# Patient Record
Sex: MALE | Race: WHITE
[De-identification: names, ages, dates, MRNs, and addresses within clinical notes are randomized per-mention and may not be internally consistent; named-entity substitution may affect disease eponyms.]

---

## 2019-07-29 ENCOUNTER — HOSPITAL ENCOUNTER (EMERGENCY)
Dept: HOSPITAL 62 - ER | Age: 49
Discharge: LEFT BEFORE DISCHARGE | End: 2019-07-29
Payer: SELF-PAY

## 2019-07-29 VITALS — DIASTOLIC BLOOD PRESSURE: 91 MMHG | SYSTOLIC BLOOD PRESSURE: 169 MMHG
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
## 2019-07-29 DIAGNOSIS — Z53.21: Primary | ICD-10-CM

## 2019-07-29 DIAGNOSIS — S61.219A: ICD-10-CM

## 2020-09-08 ENCOUNTER — HOSPITAL ENCOUNTER (EMERGENCY)
Dept: HOSPITAL 62 - ER | Age: 50
Discharge: HOME | End: 2020-09-08
Payer: SELF-PAY

## 2020-09-08 VITALS — DIASTOLIC BLOOD PRESSURE: 89 MMHG | SYSTOLIC BLOOD PRESSURE: 147 MMHG

## 2020-09-08 DIAGNOSIS — L23.7: Primary | ICD-10-CM

## 2020-09-08 DIAGNOSIS — F17.200: ICD-10-CM

## 2020-09-08 PROCEDURE — 99284 EMERGENCY DEPT VISIT MOD MDM: CPT

## 2020-09-08 PROCEDURE — 96372 THER/PROPH/DIAG INJ SC/IM: CPT

## 2020-09-08 NOTE — ER DOCUMENT REPORT
HPI





- HPI


Time Seen by Provider: 09/08/20 14:10


Pain Level: 5


Notes: 





50 yr old male presents today with complaints of poison ivy exposure to his 

right arm and the legs after being exposed to poison oak 4 days ago.  Patient 

states he is tried Benadryl and calamine without relief.  Reports he has some 

itching to his right arm, upper axilla area, right leg.  Has not tried any 

Benadryl today.  Symptoms become progressively worse daily.  Patient reports 

pain from itching is 5 out of 5.  patient has a history of having acute 

outbreaks to poison ivy exposure.  Denies fevers, chills,  chest 

pain,palpitations,  shortness of breath, dyspnea, nausea, vomiting, diarrhea, a

bdominal pain, hematuria,blurred vision, double vision, loss of vision, speech 

changes, LH, dizziness, syncope, headaches, wheezing, ST, URI, neck pain, 

weakness, bowel or bladder dysfunction, saddle anesthesia, numbness or tingling 

in bilateral upper or lower extremities equally, muscle paralysis, weakness in 

bilateral upper or lower extremities equally. 








MEDICATIONS: I agree with the patient medications as charted by the RN.





ALLERGIES: I agree with the allergies as charted by the RN.





PAST MEDICAL HISTORY/PAST SURGICAL HISTORY: Reviewed and agree as charted by RN.





SOCIAL HISTORY: Reviewed and agree as charted by RN.





FAMILY HISTORY: No significant familial comorbid conditions directly related to 

patient complaint





EXAM:


Reviewed vital signs as charted by RN.





REVIEW OF SYSTEMS:reviewed vital signs by RN


CONSTITUTIONAL :  Denies fever,  chills, or sweats.  Denies recent illness.


EENT:   Denies eye, ear, throat, or mouth pain or symptoms.  Denies nasal or 

sinus congestion or discharge.  Denies throat, tongue, or mouth swelling or 

difficulty swallowing.


CARDIOVASCULAR:  Denies chest pain.  Denies palpitations or racing or irregular 

heart beat.  Denies ankle edema.


RESPIRATORY:  Denies cough, cold, or chest congestion.  Denies shortness of 

breath, difficulty breathing, or wheezing.


GASTROINTESTINAL:  Denies abdominal pain or distention.  Denies nausea, 

vomiting, or diarrhea.  Denies blood in vomitus, stools, or per rectum.  Denies 

black, tarry stools.  Denies constipation.  


GENITOURINARY:  Denies difficulty urinating, painful urination, burning, 

frequency, blood in urine, or discharge.


MUSCULOSKELETAL:  Denies back or neck pain or stiffness.  Denies joint pain or 

swelling.


SKIN:  reports rash. denies lesions or sores.


HEMATOLOGIC :   Denies easy bruising or bleeding.


LYMPHATIC:  Denies swollen, enlarged glands.


NEUROLOGICAL:  Denies confusion or altered mental status.  Denies passing out or

loss of consciousness.  Denies dizziness or lightheadedness.  Denies headache.  

Denies weakness or paralysis or loss of use of either side.  Denies problems 

with gait or speech.  Denies sensory loss, numbness, or tingling.  Denies 

seizures.


PSYCHIATRIC:  Denies anxiety or stress.  Denies depression, suicidal ideation, 

or homicidal ideation.





ALL OTHER SYSTEMS REVIEWED AND NEGATIVE.





Dictation was performed using Dragon voice recognition software 





PHYSICAL EXAMINATION:





GENERAL: Well-appearing, well-nourished and in no acute distress.





HEAD: Atraumatic, normocephalic.





EYES: Pupils equal round and reactive to light, extraocular movements intact, 

sclera anicteric, conjunctiva are normal.





ENT: Nares patent, oropharynx clear without exudates.  Moist mucous membranes.





NECK: Normal range of motion, supple without lymphadenopathy





LUNGS: Breath sounds clear to auscultation bilaterally and equal.  No wheezes 

rales or rhonchi.





HEART: Regular rate and rhythm without murmurs





ABDOMEN: Soft, nontender, nondistended abdomen.  No guarding, no rebound.  No 

masses appreciated.





Musculoskeletal: Normal range of motion, no pitting or edema.  No cyanosis.





NEUROLOGICAL: Cranial nerves grossly intact.  Normal speech, normal gait.  

Normal sensory, motor exams 





PSYCH: Normal mood, normal affect.





SKIN: Warm, Dry, normal turgor, no rashes or lesions noted.   Macular papular 

rash in a leaf-like pattern on the right forearm with small blisters. same 

findings to lateral aspect of right knee.   Mild erythema, induration warm to 

touch to right forearm approximately 0.5 cm around blistering rash.  Radial 

pulses +2 bilaterally and equally.    + 2 BUE equally. Negative kanavels 

sign.  No  vascular compromise. Motor and sensory function of ulnar, radial, 

medial nerves intact bilaterally and equally.








- CONSTITUTIONAL


Constitutional: DENIES: Fever, Chills





- REPRODUCTIVE


Reproductive: DENIES: Pregnant:





- MUSCULOSKELETAL


Musculoskeletal: DENIES: Extremity pain





Past Medical History





- General


Information source: Patient





- Social History


Smoking Status: Current Every Day Smoker


Chew tobacco use (# tins/day): No


Frequency of alcohol use: Heavy


Drug Abuse: None


Family History: Reviewed & Not Pertinent


Patient has homicidal ideation: No


Past Surgical History: Reports: Hx Orthopedic Surgery - bilat ankle





- Immunizations


Hx Diphtheria, Pertussis, Tetanus Vaccination: Yes





Vertical Provider Document





- CONSTITUTIONAL


Agree With Documented VS: Yes


Exam Limitations: No Limitations


General Appearance: WD/WN





- INFECTION CONTROL


TRAVEL OUTSIDE OF THE [place] IN LAST 30 DAYS: No





Course





- Re-evaluation


Re-evalutation: 





09/08/20 14:32


Afebrile vital stable no distress.  Nurses notes reviewed.  Patient given 10 mg 

of Decadron IM.  Advised to take prednisone, antibiotics and ointment cream as 

directed.  Advised to not itch rash.  Advised take cold showers.  Follow-up with

primary care provider in the next 24 to 48 hours.  Please avoid scratching.  

After performing a Medical Screening Examination, I estimate there is LOW risk 

for any life threatening rash. At this time the patient looks extremely well and

there are no signs of systemic infection, however this may change at any time 

and the rash may change.  I have reevaluated this patient multiple times and no 

significant life threatening changes are noted. The patient and I have discussed

the diagnosis and risks, and we agree with discharging home with close follow-up

with the understanding that symptoms and presentations can change. We also 

discussed returning to the Emergency Department immediately if new or worsening 

symptoms occur. We have discussed the symptoms which are most concerning (e.g., 

changing or worsening pain, fever, numbness, weakness, cool or painful digits) 

that necessitate immediate return.





- Vital Signs


Vital signs: 


                                        











Temp Pulse Resp BP Pulse Ox


 


 98.5 F   107 H  16   147/89 H  98 


 


 09/08/20 14:07  09/08/20 14:07  09/08/20 14:07  09/08/20 14:07  09/08/20 14:07














Discharge





- Discharge


Clinical Impression: 


 Poison ivy dermatitis





Condition: Stable


Disposition: HOME, SELF-CARE


Instructions:  Corticosteroid Medication ([facility]), Contact Dermatitis ([facility]), 

Topical Steroid Cream or Ointment ([facility]), Clindamycin ([facility])


Additional Instructions: 


Your being seen today for poison ivy.  It appears that some of your rash turned 

in to a little bit of a cellulitis we will treat you with antibiotics along with

steroids.  You were given a shot of Decadron to help with the inflammation.  

Return for any difficulty breathing, vomiting, passing out, or any other 

symptoms that are worrisome to you. You need to take the antibiotics as 

prescribed.  Do not stop even if the rash goes away until you have completed all

the antibiotics.  The area of redness was traced out here in the emergency 

department with a marking pen.  You need to return to emergency department if 

the redness spreads outside of this area by more than 2 cm in any direction.  

You should also return if you develop fevers with temperature greater than 101, 

persistent vomiting, worsening pain, or have any other symptoms that are 

concerning to you.  Take over-the-counter Tylenol and ibuprofen during control 

as needed





Return immediately for any new or worsening symptoms.





Follow up with primary care provider, call tomorrow to make followup 

appointment.


Prescriptions: 


Triamcinolone Acetonide [Aristocort 0.1% Cream] 1 applic TP BID #1 tub


Clindamycin HCl 300 mg PO Q6H #28 capsule


Prednisone [Deltasone 20 mg Tablet] 3 tab PO DAILY 5 Days #15 tablet


Forms:  Return to Work


Referrals: 


HALEY BOB [Primary Care Provider] - Follow up as needed


JOANN HEARN MD [ACTIVE STAFF] - Follow up as needed

## 2020-09-16 ENCOUNTER — HOSPITAL ENCOUNTER (OUTPATIENT)
Dept: HOSPITAL 62 - ER | Age: 50
Setting detail: OBSERVATION
LOS: 2 days | Discharge: HOME | End: 2020-09-18
Attending: NURSE PRACTITIONER | Admitting: FAMILY MEDICINE
Payer: SELF-PAY

## 2020-09-16 DIAGNOSIS — L24.7: Primary | ICD-10-CM

## 2020-09-16 DIAGNOSIS — D72.829: ICD-10-CM

## 2020-09-16 DIAGNOSIS — F17.210: ICD-10-CM

## 2020-09-16 DIAGNOSIS — Z59.0: ICD-10-CM

## 2020-09-16 DIAGNOSIS — E86.0: ICD-10-CM

## 2020-09-16 DIAGNOSIS — L29.9: ICD-10-CM

## 2020-09-16 LAB
ABSOLUTE LYMPHOCYTES# (MANUAL): 0.6 10^3/UL (ref 0.5–4.7)
ABSOLUTE LYMPHOCYTES# (MANUAL): 2.6 10^3/UL (ref 0.5–4.7)
ABSOLUTE MONOCYTES # (MANUAL): 0.6 10^3/UL (ref 0.1–1.4)
ABSOLUTE MONOCYTES # (MANUAL): 1.3 10^3/UL (ref 0.1–1.4)
ADD MANUAL DIFF: YES
ADD MANUAL DIFF: YES
ALBUMIN SERPL-MCNC: 3.8 G/DL (ref 3.5–5)
ALP SERPL-CCNC: 97 U/L (ref 38–126)
ANION GAP SERPL CALC-SCNC: 12 MMOL/L (ref 5–19)
ANISOCYTOSIS BLD QL SMEAR: SLIGHT
AST SERPL-CCNC: 20 U/L (ref 17–59)
BASOPHILS NFR BLD MANUAL: 0 % (ref 0–2)
BASOPHILS NFR BLD MANUAL: 0 % (ref 0–2)
BILIRUB DIRECT SERPL-MCNC: 0.2 MG/DL (ref 0–0.4)
BILIRUB SERPL-MCNC: 1.1 MG/DL (ref 0.2–1.3)
BUN SERPL-MCNC: 18 MG/DL (ref 7–20)
CALCIUM: 9 MG/DL (ref 8.4–10.2)
CHLORIDE SERPL-SCNC: 100 MMOL/L (ref 98–107)
CO2 SERPL-SCNC: 24 MMOL/L (ref 22–30)
DACRYOCYTES BLD QL SMEAR: SLIGHT
DACRYOCYTES BLD QL SMEAR: SLIGHT
EOSINOPHIL NFR BLD MANUAL: 8 % (ref 0–6)
EOSINOPHIL NFR BLD MANUAL: 9 % (ref 0–6)
ERYTHROCYTE [DISTWIDTH] IN BLOOD BY AUTOMATED COUNT: 14 % (ref 11.5–14)
ERYTHROCYTE [DISTWIDTH] IN BLOOD BY AUTOMATED COUNT: 14.3 % (ref 11.5–14)
GLUCOSE SERPL-MCNC: 128 MG/DL (ref 75–110)
HCT VFR BLD CALC: 46.9 % (ref 37.9–51)
HCT VFR BLD CALC: 52.9 % (ref 37.9–51)
HGB BLD-MCNC: 16 G/DL (ref 13.5–17)
HGB BLD-MCNC: 17.8 G/DL (ref 13.5–17)
MCH RBC QN AUTO: 30.3 PG (ref 27–33.4)
MCH RBC QN AUTO: 30.3 PG (ref 27–33.4)
MCHC RBC AUTO-ENTMCNC: 33.7 G/DL (ref 32–36)
MCHC RBC AUTO-ENTMCNC: 34.2 G/DL (ref 32–36)
MCV RBC AUTO: 89 FL (ref 80–97)
MCV RBC AUTO: 90 FL (ref 80–97)
MONOCYTES % (MANUAL): 3 % (ref 3–13)
MONOCYTES % (MANUAL): 5 % (ref 3–13)
NEUTS BAND NFR BLD MANUAL: 1 % (ref 3–5)
PLATELET # BLD: 426 10^3/UL (ref 150–450)
PLATELET # BLD: 479 10^3/UL (ref 150–450)
PLATELET COMMENT: (no result)
PLATELET COMMENT: ADEQUATE
POIKILOCYTOSIS BLD QL SMEAR: SLIGHT
POIKILOCYTOSIS BLD QL SMEAR: SLIGHT
POTASSIUM SERPL-SCNC: 3.9 MMOL/L (ref 3.6–5)
PROT SERPL-MCNC: 6 G/DL (ref 6.3–8.2)
RBC # BLD AUTO: 5.29 10^6/UL (ref 4.35–5.55)
RBC # BLD AUTO: 5.88 10^6/UL (ref 4.35–5.55)
SEGMENTED NEUTROPHILS % (MAN): 75 % (ref 42–78)
SEGMENTED NEUTROPHILS % (MAN): 86 % (ref 42–78)
TOTAL CELLS COUNTED BLD: 100
TOTAL CELLS COUNTED BLD: 100
VARIANT LYMPHS NFR BLD MANUAL: 10 % (ref 13–45)
VARIANT LYMPHS NFR BLD MANUAL: 3 % (ref 13–45)
WBC # BLD AUTO: 20 10^3/UL (ref 4–10.5)
WBC # BLD AUTO: 25.7 10^3/UL (ref 4–10.5)

## 2020-09-16 PROCEDURE — 85027 COMPLETE CBC AUTOMATED: CPT

## 2020-09-16 PROCEDURE — 80053 COMPREHEN METABOLIC PANEL: CPT

## 2020-09-16 PROCEDURE — 80048 BASIC METABOLIC PNL TOTAL CA: CPT

## 2020-09-16 PROCEDURE — 36415 COLL VENOUS BLD VENIPUNCTURE: CPT

## 2020-09-16 PROCEDURE — 96375 TX/PRO/DX INJ NEW DRUG ADDON: CPT

## 2020-09-16 PROCEDURE — G0378 HOSPITAL OBSERVATION PER HR: HCPCS

## 2020-09-16 PROCEDURE — 99285 EMERGENCY DEPT VISIT HI MDM: CPT

## 2020-09-16 PROCEDURE — 83605 ASSAY OF LACTIC ACID: CPT

## 2020-09-16 PROCEDURE — 96374 THER/PROPH/DIAG INJ IV PUSH: CPT

## 2020-09-16 PROCEDURE — 85025 COMPLETE CBC W/AUTO DIFF WBC: CPT

## 2020-09-16 PROCEDURE — 99406 BEHAV CHNG SMOKING 3-10 MIN: CPT

## 2020-09-16 PROCEDURE — 87040 BLOOD CULTURE FOR BACTERIA: CPT

## 2020-09-16 RX ADMIN — SODIUM CHLORIDE PRN MLS/HR: 9 INJECTION, SOLUTION INTRAVENOUS at 22:01

## 2020-09-16 RX ADMIN — CLINDAMYCIN PHOSPHATE SCH MLS/HR: 12 INJECTION, SOLUTION INTRAVENOUS at 22:01

## 2020-09-16 SDOH — ECONOMIC STABILITY - HOUSING INSECURITY: HOMELESSNESS: Z59.0

## 2020-09-16 NOTE — ER DOCUMENT REPORT
ED General





- General


Chief Complaint: Skin Problem


Stated Complaint: SKIN ISSUE


Time Seen by Provider: 09/16/20 17:34


Notes: 





50-year-old male no significant past medical history presents with approximately

1 week of severe worsening erythematous pruritic painful rash over torso, 

bilateral arms, face and right knee.  Patient was working outside in an area 

that had known poison ivy prior to symptom onset.  Patient started on low-dose 

of p.o. steroids which he completed without relief.  No improvement with topical

steroids.  Patient denies any fever, dizziness, vomiting, discharge, change in 

vision, diabetes, HIV, immunocompromise history


TRAVEL OUTSIDE OF THE [place] IN LAST 30 DAYS: No





- Related Data


Allergies/Adverse Reactions: 


                                        





ampicillin [Ampicillin] Allergy (Verified 09/16/20 17:20)


   











Past Medical History





- General


Information source: Patient





- Social History


Smoking Status: Current Every Day Smoker


Chew tobacco use (# tins/day): No


Frequency of alcohol use: None


Drug Abuse: None


Family History: Reviewed & Not Pertinent


Patient has homicidal ideation: No


Past Surgical History: Reports: Hx Orthopedic Surgery - bilat ankle





- Immunizations


Hx Diphtheria, Pertussis, Tetanus Vaccination: Yes





Review of Systems





- Review of Systems


Notes: 





REVIEW OF SYSTEMS:


CONSTITUTIONAL :  Denies fever,  chills, or sweats. 


EENT: Denies recent cold/sinus symptoms, denies throat pain


CARDIOVASCULAR:  Denies chest pain, DICKSON


RESPIRATORY:  Denies cough, denies shortness of breath.


GASTROINTESTINAL:  Denies abdominal pain, nausea/vomiting.


GENITOURINARY:  Denies difficulty urinating, painful urination.


MUSCULOSKELETAL:  Denies neck pain, back pain.


SKIN:   + rash or skin lesions.


HEMATOLOGIC :   Denies easy bruising or bleeding.


LYMPHATIC:  Denies swollen, enlarged glands.


NEUROLOGICAL:  Denies headache, denies change in gait.


PSYCHIATRIC:  Denies anxiety or stress or depression.





Physical Exam





- Vital signs


Vitals: 


                                        











Temp Pulse Resp BP Pulse Ox


 


 97.4 F   93   20   139/82 H  99 


 


 09/16/20 16:38  09/16/20 16:38  09/16/20 16:38  09/16/20 16:38  09/16/20 16:38














- Notes


Notes: 





PHYSICAL EXAMINATION:


 


GENERAL: Well-appearing, well-nourished, talkative pleasant middle-aged man 

appearing stated age appearing uncomfortable from rash pruritus but nontoxic 

appearance and in no acute distress


 


HEAD: Atraumatic, normocephalic.


 


EYES: Pupils equal round and appropriate constriction, sclera anicteric, 

conjunctiva are normal.


 


ENT: nares patent, moist mucous membranes.


 


NECK: Normal range of motion, supple without lymphadenopathy


 


LUNGS: Breath sounds clear to auscultation bilaterally and equal.  No wheezes 

rales or rhonchi.


 


HEART: Regular rate and rhythm without murmurs


 


ABDOMEN: Soft, nontender, no guarding, no masses, no CVAT


 


EXTREMITIES: Normal range of motion, no pitting or edema.  No cyanosis.


 


NEUROLOGICAL: Awake, alert, conversing appropriately, moves all extremities s

pontaneously.


 


PSYCH: Normal mood, normal affect.


 


SKIN: Warm, Dry, normal turgor, confluent erythematous blanching rash over most 

of lower abdomen bilaterally sparing intertriginous area below pannus with 

macular rash with areas of confluence over bilateral arms.  No vesicles, no 

discharge, no purulence.  Small areas of the rash appear to be mildly edematous 

with increased warmth compared to other areas of rash including over left axilla

 and the lateral right lower abdomen.  Rash also mildly involving medial 

proximal thigh bilaterally sparing groin area, this area of rash has excessive 

moisture but no discharge.  Some areas of the mesh have dried and appearing 

mildly scaly and flaky with no impetiginous appearance.  Mild erythema of 

bilateral lower forehead and upper cheeks without discharge, normal conjunctiva,

 no edema in this area.  Flaky erythematous dry blanching rash on right knee.





Course





- Re-evaluation


Re-evalutation: 





09/16/20 21:49


Rash consistent with severe atopic dermatitis from poison ivy/oak/sumac, but 

some areas with excessive moisture.  But they could have fungal superinfection 

and small areas appear equivocally cellulitic.  Patient had labs drawn in triage

 which showed leukocytosis with small bands and neutrophil predominance but CBC 

is hemoconcentrated.  This could represent severe atopic dermatitis response or 

cellulitis, but likely exaggerated by mild dehydration.  Patient's vitals are 

normal, patient is very well-appearing, no signs of disseminated shingles, SJS, 

or TEN.  Given the patient's indolent course and general well appearance without

 systemic symptoms or signs of organ dysfunction less likely eczema herpeticum, 

but will defer this to inpatient team to reevaluate if clinical picture should 

change.  Patient appropriate for inpatient observation given extent of rash and 

mild dehydration.


09/16/20 22:27


Patient is a surgical history of appendectomy approximately 43 years ago and 2-3

 left inguinal hernia repair surgeries greater than 10 years ago, current 1 

pack/day smoker times 20 years, no alcohol or other drug use, mother had htn and

 dm and father had htn. pt accepted to observation by Dr. Weiland.





- Vital Signs


Vital signs: 


                                        











Temp Pulse Resp BP Pulse Ox


 


 98.2 F   63   18   140/71 H  100 


 


 09/16/20 22:00  09/16/20 22:00  09/16/20 22:00  09/16/20 22:00  09/16/20 22:00














- Laboratory


Result Diagrams: 


                                 09/16/20 17:45





                                 09/16/20 19:25


Laboratory results interpreted by me: 


                                        











  09/16/20 09/16/20





  17:45 19:25


 


WBC  25.7 H 


 


RBC  5.88 H 


 


Hgb  17.8 H 


 


Hct  52.9 H 


 


RDW  14.3 H 


 


Plt Count  479 H 


 


Band Neutrophils %  1 L 


 


Lymphocytes % (Manual)  10 L 


 


Eosinophils % (Manual)  9 H 


 


Abs Neuts (Manual)  19.5 H 


 


Absolute Eos (Manual)  2.3 H 


 


Sodium   135.6 L


 


Glucose   128 H


 


Total Protein   6.0 L














Discharge





- Discharge


Clinical Impression: 


 Dermatitis, Dehydration





Cellulitis


Qualifiers:


 Site of cellulitis: unspecified site Qualified Code(s): L03.90 - Cellulitis, 

unspecified





Disposition: ADMITTED AS OBSERVATION


Admitting Provider: Weiland (Hospitalist)


Unit Admitted: Medical Floor


Additional Instructions: 








Poison Ivy





     Poison ivy and poison oak can cause an itchy rash. This is called contact 

dermatitis. It's an allergy to an oil in the plant's leaves. The oil can be 

spread from clothing to skin, from pets to humans, or from one spot on the body 

to another. Washing thoroughly with soap immediately after exposure can prevent 

the rash. (Clothing should be washed as well.)


     If the oil is not removed, an itchy rash develops a few days after the 

exposure. Blisters may develop. Two to three weeks may be required for healing.


     Generally, treatment consists of: 


     (1) an immediate thorough washing with soap to remove the oil, 


     (2) application of a cortisone cream, and 


     (3) antihistamines for itching.


     If the reaction is particularly severe, oral cortisone medicine may be 

required. If there are oozing areas, these can be soaked in epsom salts or 

Burrow's solution.


     Call the doctor if the rash worsens despite treatment, or if signs of 

infection occur such as spreading redness, red streaks, swollen glands, 

swelling, or fever.

## 2020-09-16 NOTE — ER DOCUMENT REPORT
ED Skin Rash/Insect Bite/Abscs





- General


Chief Complaint: Skin Problem


Stated Complaint: SKIN ISSUE


Time Seen by Provider: 09/16/20 17:34


Notes: 





Patient is a 50-year-old male who presents emergency department with a chief 

complaint of rash.  Patient reports he was seen here last week and diagnosed 

with poison ivy.  Patient reports 3 days prior to arriving to the emergency 

department he was exposed to poison ivy while working in the yard.  Patient 

reports that he was placed on steroids, a steroid cream which only lasted 2 days

and clindamycin.  He states since then the rash has continued to spread 

throughout his torso, underneath the arms, and groin area.  Patient reports only

a small spot to his right knee.  Patient denies fever but does report some 

chills.  Patient reports intense itching.


TRAVEL OUTSIDE OF THE [place] IN LAST 30 DAYS: No





- Related Data


Allergies/Adverse Reactions: 


                                        





ampicillin [Ampicillin] Allergy (Verified 09/16/20 17:20)


   











Past Medical History





- General


Information source: Patient





- Social History


Smoking Status: Current Every Day Smoker


Chew tobacco use (# tins/day): No


Frequency of alcohol use: None


Drug Abuse: None


Lives with: Family


Family History: Reviewed & Not Pertinent


Patient has homicidal ideation: No





- Past Medical History


Cardiac Medical History: Reports: None


Pulmonary Medical History: Reports: None


EENT Medical History: Reports: None


Neurological Medical History: Reports: None


Endocrine Medical History: Reports: None


Renal/ Medical History: Reports: None


Malignancy Medical History: Reports None


GI Medical History: Reports: None


Musculoskeletal Medical History: Reports None


Skin Medical History: Reports None


Psychiatric Medical History: Reports: None


Traumatic Medical History: Reports: None


Infectious Medical History: Reports: None


Past Surgical History: Reports: Hx Orthopedic Surgery - bilat ankle





- Immunizations


Hx Diphtheria, Pertussis, Tetanus Vaccination: Yes





Review of Systems





- Review of Systems


Constitutional: No symptoms reported


EENT: No symptoms reported


Cardiovascular: No symptoms reported


Respiratory: No symptoms reported


Gastrointestinal: No symptoms reported


Genitourinary: No symptoms reported


Male Genitourinary: No symptoms reported


Musculoskeletal: No symptoms reported


Skin: See HPI


Hematologic/Lymphatic: No symptoms reported


Neurological/Psychological: No symptoms reported





Physical Exam





- Vital signs


Vitals: 


                                        











Temp Pulse Resp BP Pulse Ox


 


 97.4 F   93   20   139/82 H  99 


 


 09/16/20 16:38  09/16/20 16:38  09/16/20 16:38  09/16/20 16:38  09/16/20 16:38











Interpretation: Normal





- Notes


Notes: 





GENERAL: Well-appearing, well-nourished and in no acute distress.


HEAD: Atraumatic, normocephalic.


EYES: Pupils equal round and reactive to light, extraocular movements intact, 

sclera anicteric, conjunctiva are normal.


ENT: Moist mucous membranes.


NECK: Normal range of motion, supple without lymphadenopathy or JVD.


LUNGS: Breath sounds clear to auscultation bilaterally and equal.  No wheezes 

rales or rhonchi.


HEART: Regular rate and rhythm without murmurs, rubs or gallops.


ABDOMEN: Soft, nontender, normoactive bowel sounds.  No guarding, no rebound.  

No masses appreciated.


BACK: No cervical, thoracic, lumbar midline tenderness.  No saddle anesthesia, 

normal distal neurovascular exam.


GENITOURINARY: Deferred.


EXTREMITIES: Normal range of motion, no pitting or edema.  No clubbing or 

cyanosis.


NEUROLOGICAL: Cranial nerves II through XII grossly intact.  Normal speech, 

normal gait.


PSYCH: Normal mood, normal affect.


SKIN: Patient has scattered vesicles, cracked skin, erythema to the torso.  

Underneath bilateral arms (axilla) patient skin appears raw and irritated, 

appears moist.  No odor.





Course





- Re-evaluation


Re-evalutation: 





09/16/20 17:51 I do suspect that the severity of the dermatitis will need a 

longer course of steroids.  I will initiate an IV, do basic labs, give pain 

medication.  Will reevaluate.





Patient CBC showed an elevated white count of 25.  Lab does have to, and redraw 

the CMP.  I did order blood cultures.  The charge nurse was made aware that the 

patient will be seen by a provider once placed in a room versus staying in 

triage.





- Vital Signs


Vital signs: 


                                        











Temp Pulse Resp BP Pulse Ox


 


 97.4 F   93   20   139/82 H  99 


 


 09/16/20 16:38  09/16/20 16:38  09/16/20 16:38  09/16/20 16:38  09/16/20 16:38














- Laboratory


Result Diagrams: 


                                 09/16/20 17:45





                                 09/16/20 17:45


Laboratory results interpreted by me: 


                                        











  09/16/20





  17:45


 


WBC  25.7 H


 


RBC  5.88 H


 


Hgb  17.8 H


 


Hct  52.9 H


 


RDW  14.3 H


 


Plt Count  479 H


 


Band Neutrophils %  1 L


 


Lymphocytes % (Manual)  10 L


 


Eosinophils % (Manual)  9 H


 


Abs Neuts (Manual)  19.5 H


 


Absolute Eos (Manual)  2.3 H














Discharge





- Discharge


Clinical Impression: 


 Dermatitis





Additional Instructions: 








Poison Ivy





     Poison ivy and poison oak can cause an itchy rash. This is called contact 

dermatitis. It's an allergy to an oil in the plant's leaves. The oil can be 

spread from clothing to skin, from pets to humans, or from one spot on the body 

to another. Washing thoroughly with soap immediately after exposure can prevent 

the rash. (Clothing should be washed as well.)


     If the oil is not removed, an itchy rash develops a few days after the 

exposure. Blisters may develop. Two to three weeks may be required for healing.


     Generally, treatment consists of: 


     (1) an immediate thorough washing with soap to remove the oil, 


     (2) application of a cortisone cream, and 


     (3) antihistamines for itching.


     If the reaction is particularly severe, oral cortisone medicine may be 

required. If there are oozing areas, these can be soaked in epsom salts or 

Burrow's solution.


     Call the doctor if the rash worsens despite treatment, or if signs of 

infection occur such as spreading redness, red streaks, swollen glands, 

swelling, or fever.

## 2020-09-16 NOTE — ER DOCUMENT REPORT
ED Medical Screen (RME)





- General


Chief Complaint: Skin Problem


Stated Complaint: SKIN ISSUE


Time Seen by Provider: 09/16/20 17:34


Notes: 





 





Patient is a 50-year-old male who presents emergency department with a chief c

omplaint of rash.  Patient reports he was seen here last week and diagnosed with

poison ivy.  Patient reports 3 days prior to arriving to the emergency 

department he was exposed to poison ivy while working in the yard.  Patient 

reports that he was placed on steroids, a steroid cream which only lasted 2 days

and clindamycin.  He states since then the rash has continued to spread 

throughout his torso, underneath the arms, and groin area.  Patient reports only

a small spot to his right knee.  Patient denies fever but does report some 

chills.  Patient reports intense itching.


TRAVEL OUTSIDE OF THE [place] IN LAST 30 DAYS: No





- Related Data


Allergies/Adverse Reactions: 


                                        





ampicillin [Ampicillin] Allergy (Verified 09/16/20 17:20)


   











Past Medical History





- Social History


Chew tobacco use (# tins/day): No


Frequency of alcohol use: None


Drug Abuse: None


Past Surgical History: Reports: Hx Orthopedic Surgery - bilat ankle





- Immunizations


Hx Diphtheria, Pertussis, Tetanus Vaccination: Yes





Physical Exam





- Vital signs


Vitals: 





                                        











Temp Pulse Resp BP Pulse Ox


 


 97.4 F   93   20   139/82 H  99 


 


 09/16/20 16:38  09/16/20 16:38  09/16/20 16:38  09/16/20 16:38  09/16/20 16:38














Course





- Re-evaluation


Re-evalutation: 





09/16/20 20:27


Patient has an assortment of scattered dry areas, erythema, dried pustules on 

the torso.  Patient has significant tenderness and erythema underneath bilateral

 axillas.





Due to the extent of the patient's rash I did order basic labs in triage.  It 

was noted that patient had an elevated white count.  He was placed in a private 

room to be seen by a provider.





I have greeted and performed a rapid initial assessment of this patient.  A 

comprehensive ED assessment and evaluation of the patient, analysis of test 

results and completion of the medical decision making process will be conducted 

by additional ED providers.





- Vital Signs


Vital signs: 





                                        











Temp Pulse Resp BP Pulse Ox


 


 97.4 F   93   20   139/82 H  99 


 


 09/16/20 16:38  09/16/20 16:38  09/16/20 16:38  09/16/20 16:38  09/16/20 16:38














- Laboratory


Result Diagrams: 


                                 09/16/20 17:45





                                 09/16/20 19:25


Laboratory results interpreted by me: 





                                        











  09/16/20 09/16/20





  17:45 19:25


 


WBC  25.7 H 


 


RBC  5.88 H 


 


Hgb  17.8 H 


 


Hct  52.9 H 


 


RDW  14.3 H 


 


Plt Count  479 H 


 


Band Neutrophils %  1 L 


 


Lymphocytes % (Manual)  10 L 


 


Eosinophils % (Manual)  9 H 


 


Abs Neuts (Manual)  19.5 H 


 


Absolute Eos (Manual)  2.3 H 


 


Sodium   135.6 L


 


Glucose   128 H


 


Total Protein   6.0 L














Doctor's Discharge





- Discharge


Clinical Impression: 


 Dermatitis





Additional Instructions: 








Poison Ivy





     Poison ivy and poison oak can cause an itchy rash. This is called contact 

dermatitis. It's an allergy to an oil in the plant's leaves. The oil can be 

spread from clothing to skin, from pets to humans, or from one spot on the body 

to another. Washing thoroughly with soap immediately after exposure can prevent 

the rash. (Clothing should be washed as well.)


     If the oil is not removed, an itchy rash develops a few days after the 

exposure. Blisters may develop. Two to three weeks may be required for healing.


     Generally, treatment consists of: 


     (1) an immediate thorough washing with soap to remove the oil, 


     (2) application of a cortisone cream, and 


     (3) antihistamines for itching.


     If the reaction is particularly severe, oral cortisone medicine may be 

required. If there are oozing areas, these can be soaked in epsom salts or 

Burrow's solution.


     Call the doctor if the rash worsens despite treatment, or if signs of 

infection occur such as spreading redness, red streaks, swollen glands, 

swelling, or fever.

## 2020-09-17 LAB
ANION GAP SERPL CALC-SCNC: 6 MMOL/L (ref 5–19)
BUN SERPL-MCNC: 13 MG/DL (ref 7–20)
CALCIUM: 8.4 MG/DL (ref 8.4–10.2)
CHLORIDE SERPL-SCNC: 109 MMOL/L (ref 98–107)
CO2 SERPL-SCNC: 21 MMOL/L (ref 22–30)
ERYTHROCYTE [DISTWIDTH] IN BLOOD BY AUTOMATED COUNT: 14 % (ref 11.5–14)
GLUCOSE SERPL-MCNC: 198 MG/DL (ref 75–110)
HCT VFR BLD CALC: 42.3 % (ref 37.9–51)
HGB BLD-MCNC: 14.6 G/DL (ref 13.5–17)
MCH RBC QN AUTO: 30.5 PG (ref 27–33.4)
MCHC RBC AUTO-ENTMCNC: 34.5 G/DL (ref 32–36)
MCV RBC AUTO: 89 FL (ref 80–97)
PLATELET # BLD: 364 10^3/UL (ref 150–450)
POTASSIUM SERPL-SCNC: 4.9 MMOL/L (ref 3.6–5)
RBC # BLD AUTO: 4.78 10^6/UL (ref 4.35–5.55)
WBC # BLD AUTO: 12.5 10^3/UL (ref 4–10.5)

## 2020-09-17 RX ADMIN — Medication SCH: at 05:20

## 2020-09-17 RX ADMIN — CLINDAMYCIN PHOSPHATE SCH MLS/HR: 12 INJECTION, SOLUTION INTRAVENOUS at 13:07

## 2020-09-17 RX ADMIN — DEXAMETHASONE SCH MG: 4 TABLET ORAL at 13:05

## 2020-09-17 RX ADMIN — DOCUSATE SODIUM SCH: 100 CAPSULE, LIQUID FILLED ORAL at 17:33

## 2020-09-17 RX ADMIN — DOCUSATE SODIUM SCH: 100 CAPSULE, LIQUID FILLED ORAL at 09:56

## 2020-09-17 RX ADMIN — HEPARIN SODIUM SCH UNIT: 5000 INJECTION, SOLUTION INTRAVENOUS; SUBCUTANEOUS at 21:10

## 2020-09-17 RX ADMIN — DOXEPIN HYDROCHLORIDE SCH MG: 25 CAPSULE ORAL at 09:46

## 2020-09-17 RX ADMIN — DEXAMETHASONE SCH MG: 4 TABLET ORAL at 23:20

## 2020-09-17 RX ADMIN — Medication SCH ML: at 21:10

## 2020-09-17 RX ADMIN — DEXTROSE, SODIUM CHLORIDE, SODIUM LACTATE, POTASSIUM CHLORIDE, AND CALCIUM CHLORIDE PRN MLS/HR: 5; .6; .31; .03; .02 INJECTION, SOLUTION INTRAVENOUS at 02:20

## 2020-09-17 RX ADMIN — FAMOTIDINE SCH MG: 20 TABLET, FILM COATED ORAL at 09:46

## 2020-09-17 RX ADMIN — DOCUSATE SODIUM SCH MG: 100 CAPSULE, LIQUID FILLED ORAL at 09:46

## 2020-09-17 RX ADMIN — HEPARIN SODIUM SCH UNIT: 5000 INJECTION, SOLUTION INTRAVENOUS; SUBCUTANEOUS at 05:17

## 2020-09-17 RX ADMIN — DEXTROSE, SODIUM CHLORIDE, SODIUM LACTATE, POTASSIUM CHLORIDE, AND CALCIUM CHLORIDE PRN MLS/HR: 5; .6; .31; .03; .02 INJECTION, SOLUTION INTRAVENOUS at 08:17

## 2020-09-17 RX ADMIN — DEXAMETHASONE SODIUM PHOSPHATE SCH MG: 4 INJECTION, SOLUTION INTRAMUSCULAR; INTRAVENOUS at 09:47

## 2020-09-17 RX ADMIN — FAMOTIDINE SCH MG: 20 TABLET, FILM COATED ORAL at 21:10

## 2020-09-17 RX ADMIN — CEPHALEXIN SCH MG: 500 CAPSULE ORAL at 23:20

## 2020-09-17 RX ADMIN — CLINDAMYCIN PHOSPHATE SCH MLS/HR: 12 INJECTION, SOLUTION INTRAVENOUS at 05:18

## 2020-09-17 RX ADMIN — HEPARIN SODIUM SCH UNIT: 5000 INJECTION, SOLUTION INTRAVENOUS; SUBCUTANEOUS at 13:42

## 2020-09-17 RX ADMIN — DEXAMETHASONE SODIUM PHOSPHATE SCH MG: 4 INJECTION, SOLUTION INTRAMUSCULAR; INTRAVENOUS at 02:18

## 2020-09-17 RX ADMIN — SODIUM CHLORIDE PRN MLS/HR: 9 INJECTION, SOLUTION INTRAVENOUS at 00:20

## 2020-09-17 RX ADMIN — Medication SCH ML: at 13:45

## 2020-09-17 RX ADMIN — DEXAMETHASONE SCH MG: 4 TABLET ORAL at 17:33

## 2020-09-17 RX ADMIN — DOXEPIN HYDROCHLORIDE SCH MG: 25 CAPSULE ORAL at 21:09

## 2020-09-17 NOTE — PDOC PROGRESS REPORT
Subjective


Progress Note for:: 09/17/20


Subjective:: 


Patient is a 50-year-old male with a past medical history significant for 

tobacco dependency with continuous use who was seen in the emergency department 

1 week ago for poison ivy; treated with doxycycline and prednisone at that time.

 He represented with continued generalized contact dermatitis and was admitted 

9/16/2020.





He is found on morning rounds, he is resting in bed, comfortably, on room air.  

He reports that he is feeling somewhat better today and is looking forward to 

discharged home.


He denies fever, chills, chest pain, palpitations, dyspnea, orthopnea, abdominal

pain, nausea vomiting diarrhea.


He has no questions or concerns at this time.


No concerns per nursing.


Reason For Visit: 


PLANT CONTACT DERMATITIS








Physical Exam


Vital Signs: 


                                        











Temp Pulse Resp BP Pulse Ox


 


 97.6 F   80   24 H  147/69 H  100 


 


 09/17/20 12:25  09/17/20 12:25  09/17/20 12:25  09/17/20 12:25  09/17/20 12:25








                                 Intake & Output











 09/16/20 09/17/20 09/18/20





 06:59 06:59 06:59


 


Intake Total  1595 2463


 


Balance  1595 2463


 


Weight  104.2 kg 102.7 kg











General appearance: PRESENT: no acute distress, well-developed, well-nourished


Head exam: PRESENT: atraumatic, normocephalic


Eye exam: PRESENT: conjunctiva pink, EOMI, PERRLA.  ABSENT: scleral icterus


Mouth exam: PRESENT: moist, tongue midline


Respiratory exam: PRESENT: clear to auscultation gilbert, symmetrical, unlabored.  

ABSENT: rales, rhonchi, wheezes


Cardiovascular exam: PRESENT: RRR, +S1, +S2.  ABSENT: diastolic murmur, rubs, 

systolic murmur


Vascular exam: PRESENT: normal capillary refill


Extremities exam: PRESENT: full ROM.  ABSENT: calf tenderness, clubbing, pedal 

edema


Neurological exam: PRESENT: alert, awake, oriented to person, oriented to place,

oriented to time, oriented to situation, CN II-XII grossly intact.  ABSENT: 

motor sensory deficit


Psychiatric exam: PRESENT: appropriate affect, normal mood.  ABSENT: homicidal 

ideation, suicidal ideation


Skin exam: PRESENT: dry, erythema, intact, warm, other - Generalized plant c

ontact dermatitis involving torso, face, neck and all extremities.  ABSENT: 

cyanosis, rash





Results


Laboratory Results: 


                                        





                                 09/17/20 04:08 





                                 09/17/20 04:08 





                                        











  09/16/20 09/16/20 09/16/20





  19:25 20:54 23:03


 


WBC    20.0 H


 


RBC    5.29


 


Hgb    16.0


 


Hct    46.9


 


MCV    89


 


MCH    30.3


 


MCHC    34.2


 


RDW    14.0


 


Plt Count    426


 


Seg Neutrophils %    Not Reportable


 


Sodium  135.6 L  


 


Potassium  3.9  


 


Chloride  100  


 


Carbon Dioxide  24  


 


Anion Gap  12  


 


BUN  18  


 


Creatinine  1.23  


 


Est GFR ( Amer)  > 60  


 


Glucose  128 H  


 


Lactic Acid   2.0 


 


Calcium  9.0  


 


Total Bilirubin  1.1  


 


AST  20  


 


Alkaline Phosphatase  97  


 


Total Protein  6.0 L  


 


Albumin  3.8  














  09/17/20 09/17/20





  04:08 04:08


 


WBC  12.5 H 


 


RBC  4.78 


 


Hgb  14.6 


 


Hct  42.3 


 


MCV  89 


 


MCH  30.5 


 


MCHC  34.5 


 


RDW  14.0 


 


Plt Count  364 


 


Seg Neutrophils %  


 


Sodium   135.8 L


 


Potassium   4.9  D


 


Chloride   109 H


 


Carbon Dioxide   21 L


 


Anion Gap   6


 


BUN   13


 


Creatinine   0.80


 


Est GFR (African Amer)   > 60


 


Glucose   198 H


 


Lactic Acid  


 


Calcium   8.4


 


Total Bilirubin  


 


AST  


 


Alkaline Phosphatase  


 


Total Protein  


 


Albumin  














Assessment and Plan





- Diagnosis


(1) Plant irritant contact dermatitis


Is this a current diagnosis for this admission?: Yes   


Plan: 


Patient is admitted to the medical floor.


Keflex 500 mg every 6 hours for some erythema to right anterior thigh concerning

for early cellulitis.


Initially placed on Decadron 4 mg q6 hrs; will transition to p.o. prednisone (as

patient is self pay and prednisone therapy is more affordable) with slight dose 

reduction today.


Start prednisone 40 mg q12


Triamcinolone cream 3 times daily as needed.  Not to face.


Eucerin cream daily to dry/chapped areas.


Benadryl as needed.  


Pepcid twice daily.








(2) Leukocytosis, unspecified


Qualifiers: 


   Leukocytosis type: unspecified   Qualified Code(s): D72.829 - Elevated white 

blood cell count, unspecified   


Is this a current diagnosis for this admission?: Yes   


Plan: 


Improved; WBC is 25.7-> 20-> 12.5.


Blood cultures are pending.


Potentially related to steroid use for treatment of contact dermatitis.  

However, the patient does have patchy erythema to his right anterior thigh 

concerning for early cellulitis.  


P.o. Keflex.


Remaining management of the above.  


Follow-up CBC.








(3) Pruritus


Is this a current diagnosis for this admission?: Yes   


Plan: 


Benadryl as needed.  


Ice/cool compresses; no hot showers.


Remaining management as above.








(4) Tobacco use disorder, continuous


Is this a current diagnosis for this admission?: Yes   


Plan: 


Smoking cessation encouraged.


Nicotine replacement therapies provided.








- Time


Time Spent with patient: 25-34 minutes


Medications reviewed and adjusted accordingly: Yes


Anticipated Discharge Disposition: Home, Self Care


Anticipated Discharge Timeframe: within 24 hours

## 2020-09-17 NOTE — PDOC H&P
History of Present Illness


Admission Date/PCP: 


09/16/20    22:35


No local PCP


Patient complains of: Rash


History of Present Illness: 


CAT SANTOS is a 50 year old male who presented emergency room with a one-

week history of a poison ivy rash.  He admits working in an area with a lot of 

poison ivy 1 week ago with subsequent development of a rash over his bilateral 

arms, neck, face, chest, back and bilateral lower extremities.  The rash is 

extremely pruritic and is not improving despite treatment with oral and topical 

steroid preparations which he is received on previous visits.  He denies other 

associated or accompanying signs and symptoms.  He denies prior similar 

episodes.  He has not identified any additional aggravating or ameliorating 

factors for his rash.  In the emergency room he was noted to have an extensive 

rash of atopic dermatitis with areas of weeping and crusting.  He was further 

noted to have a leukocytosis of 25,000 with 1% bands that was so concerning to 

the emergency room physician that she demanded that he be admitted for 

observation.  Patient was subsequently admitted to observation status for 

further evaluation and treatment.





Past Medical History


Cardiac Medical History: 


   Denies: Atrial Fibrillation, Coronary Artery Disease, DVT, Myocardial 

Infarction, Hyperlipidema, Hypertension, Pulmonary Embolism


Pulmonary Medical History: 


   Denies: Asthma, Chronic Obstructive Pulmonary Disease (COPD)


EENT Medical History: 


   Denies: Cataracts, Ears - Hearing aids


Neurological Medical History: 


   Denies: Hemorrhagic CVA, Ischemic CVA, Seizures


Endocrine Medical History: 


   Denies: Diabetes Mellitus Type 1, Diabetes Mellitus Type 2, Hyperthyroidism, 

Hypothyroidism, Obesity


Renal/ Medical History: 


   Denies: Chronic Kidney Disease, Nephrolithiasis


Malignancy Medical History: Reports: None


GI Medical History: 


   Denies: Cirrhosis, Crohn's Disease, Gastroesophageal Reflux Disease, 

Hepatitis, Peptic Ulcer Disease, Ulcerative Colitis


Musculoskeltal Medical History: 


   Denies: Arthritis, Fibromyalgia, Gout


Skin Medical History: 


   Denies: Eczema, Psoriasis


Psychiatric Medical History: Reports: Tobacco Dependency


   Denies: Alcohol Dependency, Substance Abuse


Traumatic Medical History: Reports: None


Hematology: 


   Denies: Anemia, Bleeding Tendencies


Infectious Medical History: Reports: None





Past Surgical History


Past Surgical History: Reports: Orthopedic Surgery - bilat ankle





Social History


Information Source: Patient


Lives with: Homeless


Smoking Status: Current Every Day Smoker


Electronic Cigarette use?: No


Frequency of Alcohol Use: None


Hx Recreational Drug Use: No


Drugs: None


Hx Prescription Drug Abuse: No





- Advance Directive


Resuscitation Status: Full Code


Surrogate healthcare decision maker:: 


Lucas Balbuena





Family History


Family History: DM, Hypertension.  denies: CAD, Malignancy


Parental Family History Reviewed: Yes


Children Family History Reviewed: No


Sibling(s) Family History Reviewed.: Yes





Medication/Allergy


Home Medications: 








Clindamycin HCl 300 mg PO Q6H #28 capsule 09/08/20 


Prednisone [Deltasone 20 mg Tablet] 3 tab PO DAILY 5 Days #15 tablet 09/08/20 


Triamcinolone Acetonide [Aristocort 0.1% Cream] 1 applic TP BID #1 tub 09/08/20 








Allergies/Adverse Reactions: 


                                        





ampicillin [Ampicillin] Allergy (Verified 09/16/20 17:20)


   











Review of Systems


Constitutional: ABSENT: chills, fever(s)


Eyes: ABSENT: visual disturbances, other - Eye pain


Ears: ABSENT: hearing changes, other - UTI


Nose, Mouth, and Throat: ABSENT: headache(s), sore throat


Cardiovascular: ABSENT: chest pain, palpitations


Respiratory: ABSENT: cough, dyspnea


Gastrointestinal: ABSENT: abdominal pain, constipation, diarrhea, nausea, 

vomiting


Genitourinary: ABSENT: dysuria, hematuria


Musculoskeletal: ABSENT: back pain, joint swelling


Integumentary: ABSENT: pruritus, rash


Neurological: ABSENT: confusion, convulsions, focal weakness, memory loss, 

syncope


Psychiatric: ABSENT: anxiety, depression


Endocrine: ABSENT: cold intolerance, heat intolerance


Hematologic/Lymphatic: ABSENT: easy bleeding, easy bruising


Allergic/Immunologic: ABSENT: seasonal rhinorrhea





Physical Exam


Vital Signs: 


                                        











Temp Pulse Resp BP Pulse Ox


 


 98.2 F   63   18   140/71 H  100 


 


 09/16/20 22:00  09/16/20 22:00  09/16/20 22:00  09/16/20 22:00  09/16/20 22:00








                                 Intake & Output











 09/14/20 09/15/20 09/16/20





 23:59 23:59 23:59


 


Weight   104.2 kg











General appearance: PRESENT: cooperative, disheveled, other - Moderate distress 

and due to pruritus


Head exam: PRESENT: atraumatic, normocephalic


Eye exam: PRESENT: conjunctiva pink.  ABSENT: conjunctival injection, scleral 

icterus


Ear exam: PRESENT: normal external ear exam.  ABSENT: bleeding, drainage


Mouth exam: PRESENT: dry mucosa, neck supple


Neck exam: ABSENT: thyromegaly, tracheal deviation


Respiratory exam: PRESENT: clear to auscultation gilbert, symmetrical, unlabored


Cardiovascular exam: PRESENT: RRR.  ABSENT: clicks, gallop, rubs


Pulses: PRESENT: normal radial pulses, normal dorsalis pedis pul


Vascular exam: PRESENT: normal capillary refill.  ABSENT: pallor


GI/Abdominal exam: PRESENT: normal bowel sounds, soft.  ABSENT: tenderness


Rectal exam: PRESENT: deferred


Extremities exam: ABSENT: joint swelling, pedal edema


Musculoskeletal exam: ABSENT: deformity, dislocation


Neurological exam: PRESENT: alert, oriented to person, oriented to place, 

oriented to time, oriented to situation, CN II-XII grossly intact.  ABSENT: 

motor sensory deficit


Psychiatric exam: PRESENT: appropriate affect, normal mood


Skin exam: PRESENT: dry, intact, rash - Generalized plant contact dermatitis 

involving torso, face, neck and all extremities., warm.  ABSENT: jaundice, 

urticaria





Results


Laboratory Results: 


                                        





                                 09/16/20 17:45 





                                 09/16/20 19:25 





                                        











  09/16/20 09/16/20 09/16/20





  17:45 17:45 19:25


 


WBC  25.7 H  


 


RBC  5.88 H  


 


Hgb  17.8 H  


 


Hct  52.9 H  


 


MCV  90  


 


MCH  30.3  


 


MCHC  33.7  


 


RDW  14.3 H  


 


Plt Count  479 H  


 


Seg Neutrophils %  Not Reportable  


 


Sodium   Cancelled  135.6 L


 


Potassium   Cancelled  3.9


 


Chloride   Cancelled  100


 


Carbon Dioxide   Cancelled  24


 


Anion Gap   Cancelled  12


 


BUN   Cancelled  18


 


Creatinine   Cancelled  1.23


 


Est GFR ( Amer)   Cancelled  > 60


 


Est GFR (Non-Af Amer)   Cancelled 


 


Glucose   Cancelled  128 H


 


Lactic Acid   


 


Calcium   Cancelled  9.0


 


Total Bilirubin   Cancelled  1.1


 


AST   Cancelled  20


 


Alkaline Phosphatase   Cancelled  97


 


Total Protein   Cancelled  6.0 L


 


Albumin   Cancelled  3.8














  09/16/20





  20:54


 


WBC 


 


RBC 


 


Hgb 


 


Hct 


 


MCV 


 


MCH 


 


MCHC 


 


RDW 


 


Plt Count 


 


Seg Neutrophils % 


 


Sodium 


 


Potassium 


 


Chloride 


 


Carbon Dioxide 


 


Anion Gap 


 


BUN 


 


Creatinine 


 


Est GFR ( Amer) 


 


Est GFR (Non-Af Amer) 


 


Glucose 


 


Lactic Acid  2.0


 


Calcium 


 


Total Bilirubin 


 


AST 


 


Alkaline Phosphatase 


 


Total Protein 


 


Albumin 














Assessment and Plan





- Diagnosis


(1) Plant irritant contact dermatitis


Is this a current diagnosis for this admission?: Yes   





(2) Pruritus


Is this a current diagnosis for this admission?: Yes   





(3) Leukocytosis, unspecified


Qualifiers: 


   Leukocytosis type: unspecified   Qualified Code(s): D72.829 - Elevated white 

blood cell count, unspecified   


Is this a current diagnosis for this admission?: Yes   





(4) Tobacco use disorder, continuous


Is this a current diagnosis for this admission?: Yes   





- Plan Summary


Summary: 


Patient was admitted observation status on the medical floor where he will 

receive routine supportive and symptomatic cares.  He will be treated with IV 

fluids utilizing D5LR at 167 mL/h.  He will receive dexamethasone intravenously 

with an initial burst dose protocol and conversion to oral therapy.  He will 

receive doxepin 50 mg p.o. every 12 hours for pruritus.  He will receive mo

rphine sulfate 2 to 4 mg IV every 2 hours as needed for pain.  He will receive 

Ativan 1 mg IV every 4 hours as needed for anxiety or restlessness.  Smoking 

cessation is advised and counseled briefly at the bedside.  A nicotine 

replacement patch is available for the patient's use if desired.  CBCs, m

etabolic profiles and additional laboratory and/or radiographic evaluations will

 be obtained as needed.





- Time


Time Spent with patient: 15-24 minutes


Smoking Cessation Education: 3 to 10 minutes


Medications reviewed and adjusted accordingly: Yes


Anticipated Discharge Disposition: Home, Self Care


Anticipated Discharge Timeframe: within 24 hours





- Inpatient Certification


Based on my medical assessment, after consideration of the patient's 

comorbidities, presenting symptoms, or acuity I expect that the services needed 

warrant INPATIENT care.: No


I certify that my determination is in accordance with my understanding of 

Medicare's requirements for reasonable and necessary INPATIENT services [42 CFR 

412.3e].: No

## 2020-09-18 VITALS — SYSTOLIC BLOOD PRESSURE: 134 MMHG | DIASTOLIC BLOOD PRESSURE: 80 MMHG

## 2020-09-18 LAB
ERYTHROCYTE [DISTWIDTH] IN BLOOD BY AUTOMATED COUNT: 14.4 % (ref 11.5–14)
HCT VFR BLD CALC: 39 % (ref 37.9–51)
HGB BLD-MCNC: 13.3 G/DL (ref 13.5–17)
MCH RBC QN AUTO: 30.2 PG (ref 27–33.4)
MCHC RBC AUTO-ENTMCNC: 34.1 G/DL (ref 32–36)
MCV RBC AUTO: 89 FL (ref 80–97)
PLATELET # BLD: 356 10^3/UL (ref 150–450)
RBC # BLD AUTO: 4.39 10^6/UL (ref 4.35–5.55)
WBC # BLD AUTO: 20.2 10^3/UL (ref 4–10.5)

## 2020-09-18 RX ADMIN — DOXEPIN HYDROCHLORIDE SCH MG: 25 CAPSULE ORAL at 10:03

## 2020-09-18 RX ADMIN — FAMOTIDINE SCH MG: 20 TABLET, FILM COATED ORAL at 10:03

## 2020-09-18 RX ADMIN — DOCUSATE SODIUM SCH MG: 100 CAPSULE, LIQUID FILLED ORAL at 10:03

## 2020-09-18 RX ADMIN — DEXAMETHASONE SCH MG: 4 TABLET ORAL at 05:46

## 2020-09-18 RX ADMIN — CEPHALEXIN SCH MG: 500 CAPSULE ORAL at 05:46

## 2020-09-18 RX ADMIN — Medication SCH: at 09:47

## 2020-09-18 RX ADMIN — HEPARIN SODIUM SCH UNIT: 5000 INJECTION, SOLUTION INTRAVENOUS; SUBCUTANEOUS at 05:45

## 2020-09-18 NOTE — PDOC DISCHARGE SUMMARY
Impression





- Admit/DC Date/PCP


Admission Date/Primary Care Provider: 


  09/16/20 22:35





  





Discharge Date: 09/18/20





- Discharge Diagnosis


(1) Plant irritant contact dermatitis


Is this a current diagnosis for this admission?: Yes   





(2) Leukocytosis, unspecified


Is this a current diagnosis for this admission?: Yes   





(3) Pruritus


Is this a current diagnosis for this admission?: Yes   





(4) Tobacco use disorder, continuous


Is this a current diagnosis for this admission?: Yes   





- Additional Information


Resuscitation Status: Full Code


Discharge Diet: Regular


Discharge Activity: Activity As Tolerated, Balance Activity w/Rest


Prescriptions: 


Triamcinolone Acetonide [Aristocort 0.1% Cream] 1 applic TOP TIDP PRN #1 tube


 PRN Reason: 


Prednisone [Deltasone 20 mg Tablet] 20 mg PO ASDIR PRN #20 tablet


 PRN Reason: 


Prednisone [Deltasone 20 mg Tablet] 20 mg PO ASDIR #20 tablet


Cephalexin Monohydrate [Keflex 500 mg Capsule] 500 mg PO Q6 #20 capsule


Nicotine [Nicoderm 21 mg/24 Hr Transderm Patch] 1 each TD DAILYP PRN #30 

[email or web address]


 PRN Reason: 


Home Medications: 








Acetaminophen [Tylenol 325 mg Tablet] 650 mg PO Q4HP PRN  tablet 09/18/20 


Cephalexin Monohydrate [Keflex 500 mg Capsule] 500 mg PO Q6 #20 capsule 09/18/20




Diphenhydramine HCl [Benadryl 25 mg Capsule] 25 mg PO Q6HP PRN  capsule 09/18/20




Mineral Oil/Petrolatum,White [Eucerin Cream 114 gm] 1 applic TP DAILY  jar 

09/18/20 


Nicotine [Nicoderm 21 mg/24 Hr Transderm Patch] 1 each TD DAILYP PRN #30 

[email or web address] 09/18/20 


Prednisone [Deltasone 20 mg Tablet] 20 mg PO ASDIR #20 tablet 09/18/20 


Prednisone [Deltasone 20 mg Tablet] 20 mg PO ASDIR PRN #20 tablet 09/18/20 


Triamcinolone Acetonide [Aristocort 0.1% Cream] 1 applic TOP TIDP PRN #1 tube 

09/18/20 











History of Present Illiness


History of Present Illness: 


Per H&P by Dr. Weiland: CAT SANTOS is a 50 year old male who presented 

emergency room with a one-week history of a poison ivy rash.  He admits working 

in an area with a lot of poison ivy 1 week ago with subsequent development of a 

rash over his bilateral arms, neck, face, chest, back and bilateral lower 

extremities.  The rash is extremely pruritic and is not improving despite 

treatment with oral and topical steroid preparations which he is received on 

previous visits.  He denies other associated or accompanying signs and symptoms.

 He denies prior similar episodes.  He has not identified any additional 

aggravating or ameliorating factors for his rash.  In the emergency room he was 

noted to have an extensive rash of atopic dermatitis with areas of weeping and 

crusting.  He was further noted to have a leukocytosis of 25,000 with 1% bands 

that was so concerning to the emergency room physician that she demanded that he

be admitted for observation.  Patient was subsequently admitted to observation 

status for further evaluation and treatment.





Hospital Course


Hospital Course: 


(1) Plant irritant contact dermatitis


Improved.


Patient was admitted to the medical floor.


Keflex 500 mg every 6 hours for some erythema to right anterior thigh concerning

for early cellulitis.


Initially placed on Decadron 4 mg q6 hrs; have transitioned to p.o. prednisone 

(as patient is self pay and prednisone therapy is more affordable) with slight 

dose reduction today.


Continue 12 day prednisone taper.


Triamcinolone cream 3 times daily as needed.  Not to face.


Eucerin cream daily to dry/chapped areas.


Benadryl as needed.  





(2) Leukocytosis, unspecified


WBC is 25.7-> 20-> 12.5-> 20


Blood cultures are negative at 24 hours


Potentially related to steroid use for treatment of contact dermatitis.  


Continue p.o. Keflex.


Remaining management of the above.  


Recommend repeat CBC by PCP at follow up appointment.





(3) Pruritus


Benadryl as needed.  


Ice/cool compresses; no hot showers.


Remaining management as above.





(4) Tobacco use disorder, continuous


Smoking cessation encouraged.


Nicotine replacement therapies provided.





Physical Exam


Vital Signs: 


                                        











Temp Pulse Resp BP Pulse Ox


 


 97.7 F   67   20   134/80 H  98 


 


 09/18/20 08:00  09/18/20 08:00  09/18/20 08:00  09/18/20 08:00  09/18/20 08:00








                                 Intake & Output











 09/17/20 09/18/20 09/19/20





 06:59 06:59 06:59


 


Intake Total 1595 2943 


 


Balance 1595 2943 


 


Weight 104.2 kg 103.2 kg 











General appearance: PRESENT: no acute distress, cooperative, well-developed, 

well-nourished


Head exam: PRESENT: atraumatic, normocephalic


Eye exam: PRESENT: conjunctiva pink, EOMI, PERRLA.  ABSENT: scleral icterus


Mouth exam: PRESENT: moist, tongue midline


Teeth exam: PRESENT: poor dentation


Respiratory exam: PRESENT: clear to auscultation gilbert, symmetrical, unlabored.  

ABSENT: rales, rhonchi, wheezes


Cardiovascular exam: PRESENT: RRR.  ABSENT: diastolic murmur, rubs, systolic 

murmur


Pulses: PRESENT: normal dorsalis pedis pul


Vascular exam: PRESENT: normal capillary refill


Extremities exam: PRESENT: full ROM.  ABSENT: calf tenderness, clubbing, pedal 

edema


Neurological exam: PRESENT: alert, awake, oriented to person, oriented to place,

oriented to time, oriented to situation, CN II-XII grossly intact.  ABSENT: sarah

r sensory deficit


Psychiatric exam: PRESENT: appropriate affect, normal mood.  ABSENT: homicidal 

ideation, suicidal ideation


Skin exam: PRESENT: dry, warm, other - Generalized plant contact dermatitis 

involving torso, face, neck and all extremities;p significantly improved.  

ABSENT: cyanosis, intact, rash





Results


Laboratory Results: 


                                        











WBC  20.2 10^3/uL (4.0-10.5)  H  09/18/20  05:23    


 


RBC  4.39 10^6/uL (4.35-5.55)   09/18/20  05:23    


 


Hgb  13.3 g/dL (13.5-17.0)  L  09/18/20  05:23    


 


Hct  39.0 % (37.9-51.0)   09/18/20  05:23    


 


MCV  89 fl (80-97)   09/18/20  05:23    


 


MCH  30.2 pg (27.0-33.4)   09/18/20  05:23    


 


MCHC  34.1 g/dL (32.0-36.0)   09/18/20  05:23    


 


RDW  14.4 % (11.5-14.0)  H  09/18/20  05:23    


 


Plt Count  356 10^3/uL (150-450)   09/18/20  05:23    


 


Lymph % (Auto)  Not Reportable   09/16/20  23:03    


 


Mono % (Auto)  Not Reportable   09/16/20  23:03    


 


Eos % (Auto)  Not Reportable   09/16/20  23:03    


 


Baso % (Auto)  Not Reportable   09/16/20  23:03    


 


Absolute Neuts (auto)  Not Reportable   09/16/20  23:03    


 


Absolute Lymphs (auto)  Not Reportable   09/16/20  23:03    


 


Absolute Monos (auto)  Not Reportable   09/16/20  23:03    


 


Absolute Eos (auto)  Not Reportable   09/16/20  23:03    


 


Absolute Basos (auto)  Not Reportable   09/16/20  23:03    


 


Total Counted  100   09/16/20  23:03    


 


Seg Neutrophils %  Not Reportable   09/16/20  23:03    


 


Seg Neuts % (Manual)  86 % (42-78)  H  09/16/20  23:03    


 


Band Neutrophils %  1 % (3-5)  L  09/16/20  17:45    


 


Lymphocytes % (Manual)  3 % (13-45)  L  09/16/20  23:03    


 


Monocytes % (Manual)  3 % (3-13)   09/16/20  23:03    


 


Eosinophils % (Manual)  8 % (0-6)  H  09/16/20  23:03    


 


Basophils % (Manual)  0 % (0-2)   09/16/20  23:03    


 


Abs Neuts (Manual)  17.2 10^3/uL (1.7-8.2)  H  09/16/20  23:03    


 


Abs Lymphs (Manual)  0.6 10^3/uL (0.5-4.7)   09/16/20  23:03    


 


Abs Monocytes (Manual)  0.6 10^3/uL (0.1-1.4)   09/16/20  23:03    


 


Absolute Eos (Manual)  1.6 10^3/uL (0.0-0.6)  H  09/16/20  23:03    


 


Abs Basophils (Manual)  0.0 10^3/uL (0.0-0.2)   09/16/20  23:03    


 


Platelet Comment  ADEQUATE   09/16/20  23:03    


 


Poikilocytosis  SLIGHT   09/16/20  23:03    


 


Anisocytosis  SLIGHT   09/16/20  23:03    


 


Tear Drop Cells  SLIGHT   09/16/20  23:03    


 


Sodium  135.8 mmol/L (137-145)  L  09/17/20  04:08    


 


Potassium  4.9 mmol/L (3.6-5.0)  D 09/17/20  04:08    


 


Chloride  109 mmol/L ()  H  09/17/20  04:08    


 


Carbon Dioxide  21 mmol/L (22-30)  L  09/17/20  04:08    


 


Anion Gap  6  (5-19)   09/17/20  04:08    


 


BUN  13 mg/dL (7-20)   09/17/20  04:08    


 


Creatinine  0.80 mg/dL (0.52-1.25)   09/17/20  04:08    


 


Est GFR ( Amer)  > 60  (>60)   09/17/20  04:08    


 


Est GFR (Non-Af Amer)  Cancelled   09/16/20  17:45    


 


Est GFR (MDRD) Non-Af  > 60  (>60)   09/17/20  04:08    


 


Glucose  198 mg/dL ()  H  09/17/20  04:08    


 


Lactic Acid  2.0 mmol/L (0.7-2.1)   09/16/20  20:54    


 


Calcium  8.4 mg/dL (8.4-10.2)   09/17/20  04:08    


 


Total Bilirubin  1.1 mg/dL (0.2-1.3)   09/16/20  19:25    


 


Direct Bilirubin  0.2 mg/dL (0.0-0.4)   09/16/20  19:25    


 


Neonat Total Bilirubin  Not Reportable   09/16/20  19:25    


 


Neonat Direct Bilirubin  Not Reportable   09/16/20  19:25    


 


Neonat Indirect Bili  Not Reportable   09/16/20  19:25    


 


AST  20 U/L (17-59)   09/16/20  19:25    


 


ALT  16 U/L (<50)   09/16/20  19:25    


 


Alkaline Phosphatase  97 U/L ()   09/16/20  19:25    


 


Total Protein  6.0 g/dL (6.3-8.2)  L  09/16/20  19:25    


 


Albumin  3.8 g/dL (3.5-5.0)   09/16/20  19:25    


 


EGFR   Cancelled   09/16/20  17:45    














Stroke


Is this a Stroke Patient?: No





Acute Heart Failure


Is this a Heart Failure Patient?: No

## 2020-09-28 ENCOUNTER — HOSPITAL ENCOUNTER (EMERGENCY)
Dept: HOSPITAL 62 - ER | Age: 50
Discharge: HOME | End: 2020-09-28
Payer: SELF-PAY

## 2020-09-28 VITALS — SYSTOLIC BLOOD PRESSURE: 145 MMHG | DIASTOLIC BLOOD PRESSURE: 78 MMHG

## 2020-09-28 DIAGNOSIS — Z88.0: ICD-10-CM

## 2020-09-28 DIAGNOSIS — L08.9: ICD-10-CM

## 2020-09-28 DIAGNOSIS — F17.200: ICD-10-CM

## 2020-09-28 DIAGNOSIS — Z79.899: ICD-10-CM

## 2020-09-28 DIAGNOSIS — L24.7: Primary | ICD-10-CM

## 2020-09-28 DIAGNOSIS — Z82.49: ICD-10-CM

## 2020-09-28 DIAGNOSIS — R03.0: ICD-10-CM

## 2020-09-28 LAB
ABSOLUTE LYMPHOCYTES# (MANUAL): 2.6 10^3/UL (ref 0.5–4.7)
ABSOLUTE MONOCYTES # (MANUAL): 0.9 10^3/UL (ref 0.1–1.4)
ADD MANUAL DIFF: YES
ALBUMIN SERPL-MCNC: 3.8 G/DL (ref 3.5–5)
ALP SERPL-CCNC: 81 U/L (ref 38–126)
ANION GAP SERPL CALC-SCNC: 7 MMOL/L (ref 5–19)
ANISOCYTOSIS BLD QL SMEAR: SLIGHT
AST SERPL-CCNC: 19 U/L (ref 17–59)
BASOPHILS NFR BLD MANUAL: 0 % (ref 0–2)
BILIRUB DIRECT SERPL-MCNC: 0.3 MG/DL (ref 0–0.4)
BILIRUB SERPL-MCNC: 1.6 MG/DL (ref 0.2–1.3)
BUN SERPL-MCNC: 18 MG/DL (ref 7–20)
CALCIUM: 9.3 MG/DL (ref 8.4–10.2)
CHLORIDE SERPL-SCNC: 103 MMOL/L (ref 98–107)
CO2 SERPL-SCNC: 27 MMOL/L (ref 22–30)
EOSINOPHIL NFR BLD MANUAL: 9 % (ref 0–6)
ERYTHROCYTE [DISTWIDTH] IN BLOOD BY AUTOMATED COUNT: 15 % (ref 11.5–14)
GLUCOSE SERPL-MCNC: 102 MG/DL (ref 75–110)
HCT VFR BLD CALC: 45 % (ref 37.9–51)
HGB BLD-MCNC: 15.4 G/DL (ref 13.5–17)
MCH RBC QN AUTO: 31.1 PG (ref 27–33.4)
MCHC RBC AUTO-ENTMCNC: 34.3 G/DL (ref 32–36)
MCV RBC AUTO: 91 FL (ref 80–97)
MONOCYTES % (MANUAL): 5 % (ref 3–13)
PLATELET # BLD: 329 10^3/UL (ref 150–450)
PLATELET COMMENT: ADEQUATE
POTASSIUM SERPL-SCNC: 4.3 MMOL/L (ref 3.6–5)
PROT SERPL-MCNC: 6.1 G/DL (ref 6.3–8.2)
RBC # BLD AUTO: 4.96 10^6/UL (ref 4.35–5.55)
SEGMENTED NEUTROPHILS % (MAN): 72 % (ref 42–78)
TOTAL CELLS COUNTED BLD: 100
VARIANT LYMPHS NFR BLD MANUAL: 14 % (ref 13–45)
WBC # BLD AUTO: 18.4 10^3/UL (ref 4–10.5)

## 2020-09-28 PROCEDURE — 99284 EMERGENCY DEPT VISIT MOD MDM: CPT

## 2020-09-28 PROCEDURE — 96372 THER/PROPH/DIAG INJ SC/IM: CPT

## 2020-09-28 PROCEDURE — 36415 COLL VENOUS BLD VENIPUNCTURE: CPT

## 2020-09-28 PROCEDURE — 80053 COMPREHEN METABOLIC PANEL: CPT

## 2020-09-28 PROCEDURE — 85025 COMPLETE CBC W/AUTO DIFF WBC: CPT

## 2020-09-28 NOTE — ER DOCUMENT REPORT
ED Medical Screen (RME)





- General


Chief Complaint: Rash


Stated Complaint: REVISIT/RASH


Time Seen by Provider: 09/28/20 10:43


Notes: 





Patient is a 50-year-old male who presents emergency department for a chief 

complaint of a rash.  Patient was exposed to poison ivy on 9/4/2020.  Patient 

was given triamcinolone cream and clindamycin.  He was then sent home.  He then 

returned on 9/16 and was admitted to the hospital.  2 days later, he was disch

arged home.  He states that when he went home he continued his medications as 

prescribed, but states that the rash got worse.





Exam: Erythema noted to extremities, consistent with poison ivy rash.





I have greeted and performed a rapid initial assessment of this patient.  A 

comprehensive ED assessment and evaluation of the patient, analysis of test 

results and completion of medical decision making process will be conducted by 

an additional ED providers.


TRAVEL OUTSIDE OF THE [place] IN LAST 30 DAYS: No





- Related Data


Allergies/Adverse Reactions: 


                                        





ampicillin [Ampicillin] Allergy (Verified 09/28/20 10:43)


   











Past Medical History





- Past Medical History


Cardiac Medical History: 


   Denies: Hx Atrial Fibrillation, Hx Coronary Artery Disease, Hx DVT, Hx Heart 

Attack, Hx Hypercholesterolemia, Hx Hypertension, Hx Pulmonary Embolism


Pulmonary Medical History: 


   Denies: Hx Asthma, Hx COPD


Neurological Medical History: Denies: Hx Seizures


Endocrine Medical History: Denies: Hx Diabetes Mellitus Type 1, Hx Diabetes 

Mellitus Type 2, Hx Hyperthyroidism, Hx Hypothyroidism


GI Medical History: Denies: Hx Cirrhosis, Hx Crohn's Disease, Hx 

Gastroesophageal Reflux Disease, Hx Hepatitis, Hx Ulcerative Colitis


Musculoskeltal Medical History: Denies Hx Arthritis, Denies Hx Fibromyalgia, 

Denies Hx Gout


Skin Medical History: Denies Hx Eczema, Denies Hx Psoriasis


Psychiatric Medical History: 


   Denies: Hx Depression


Infectious Medical History: Denies: Hx Hepatitis


Past Surgical History: Reports: Hx Orthopedic Surgery - bilat ankle





- Immunizations


Hx Diphtheria, Pertussis, Tetanus Vaccination: Yes





Physical Exam





- Vital signs


Vitals: 





                                        











Temp Pulse Resp BP Pulse Ox


 


 97.9 F   99   18   151/85 H  98 


 


 09/28/20 09:36  09/28/20 09:36  09/28/20 09:36  09/28/20 09:36  09/28/20 09:36














Course





- Vital Signs


Vital signs: 





                                        











Temp Pulse Resp BP Pulse Ox


 


 97.9 F   99   18   151/85 H  98 


 


 09/28/20 09:36  09/28/20 09:36  09/28/20 09:36  09/28/20 09:36  09/28/20 09:36

## 2020-09-28 NOTE — ER DOCUMENT REPORT
ED General





- General


Chief Complaint: Rash


Stated Complaint: REVISIT/RASH


Time Seen by Provider: 09/28/20 10:43


Mode of Arrival: Ambulatory


Information source: Patient


Notes: 





Patient is a 50-year-old  male coming in today for evaluation of 

contact dermatitis.  Apparently around the middle of the month he came in 

initially for exposure to poison ivy with an extensive rash.  Apparently it was 

quite an impressive presentation and he was admitted in observation status and 

received IV antihistamines, steroids, antibiotics.  He was discharged home on 

meds.  He states that his symptoms had improved.  He finished all the meds that 

were indicated and now he notes that the rash has come back.  He is not having 

any kind of oropharyngeal involvement.  Handling secretions well.  No shortness 

of breath.  He has no history of anaphylaxis.


TRAVEL OUTSIDE OF THE [place] IN LAST 30 DAYS: No





- Related Data


Allergies/Adverse Reactions: 


                                        





ampicillin [Ampicillin] Allergy (Verified 09/28/20 10:43)


   








Home Medications: BENADRYL





Past Medical History





- Social History


Smoking Status: Current Every Day Smoker


Chew tobacco use (# tins/day): No


Frequency of alcohol use: None


Drug Abuse: None


Family History: DM, Hypertension.  denies: CAD, Malignancy


Patient has homicidal ideation: No





- Past Medical History


Cardiac Medical History: 


   Denies: Hx Atrial Fibrillation, Hx Coronary Artery Disease, Hx DVT, Hx Heart 

Attack, Hx Hypercholesterolemia, Hx Hypertension, Hx Pulmonary Embolism


Pulmonary Medical History: 


   Denies: Hx Asthma, Hx COPD


Neurological Medical History: Denies: Hx Seizures


Endocrine Medical History: Denies: Hx Diabetes Mellitus Type 1, Hx Diabetes 

Mellitus Type 2, Hx Hyperthyroidism, Hx Hypothyroidism


GI Medical History: Denies: Hx Cirrhosis, Hx Crohn's Disease, Hx 

Gastroesophageal Reflux Disease, Hx Hepatitis, Hx Ulcerative Colitis


Musculoskeletal Medical History: Denies Hx Arthritis, Denies Hx Fibromyalgia, 

Denies Hx Gout


Skin Medical History: Denies Hx Eczema, Denies Hx Psoriasis


Psychiatric Medical History: 


   Denies: Hx Depression


Infectious Medical History: Denies: Hx Hepatitis


Past Surgical History: Reports: Hx Orthopedic Surgery - bilat ankle





- Immunizations


Hx Diphtheria, Pertussis, Tetanus Vaccination: Yes





Review of Systems





- Review of Systems


Notes: 





Constitutional:  No fevers. No chills.





EENT: No eye redness. No eye pain. No ear pain. No sore throat.





Cardiovascular:  No chest pain. No palpitations.





Respiratory: No cough. No shortness of breath. No respiratory distress.





Gastrointestinal: No abdominal pain. No nausea, vomiting, or diarrhea.





Genitourinary: Atraumatic. No lesions. No pain. No discharge.





Musculoskeletal: Atraumatic. No swelling. No deformities.





Skin: +rash





Lymphatic: No swollen lymph nodes.





Neurologic: No headache. No syncope.





Psychiatric: No suicidal or homicidal ideation.





Physical Exam





- Vital signs


Vitals: 





                                        











Temp Pulse Resp BP Pulse Ox


 


 97.9 F   99   18   151/85 H  98 


 


 09/28/20 09:36  09/28/20 09:36  09/28/20 09:36  09/28/20 09:36  09/28/20 09:36














- Notes


Notes: 





General: Well-developed, well-nourished. In no acute distress. Non-toxic 

appearing.  Patient looks uncomfortable





Cardiac: Well-perfused. Regular rate and rhythm. No murmurs, rubs, or gallops. 





Pulmonary: No respiratory distress. No cyanosis. Bilateral lung fields are clear

 to auscultation.





Abdominal: Non-distended. Non-rigid. Bowels sounds are present in all four 

quadrants. No guarding or rebound.





HEENT: Head is atraumatic. Conjunctivae not reddened. No tearing. PERRL. EOMI. 

Orbits atraumatic. No periorbital swelling or erythema. Oropharynx is without 

erythema, swelling, or exudates.





Neck: Supple. No adenopathy. No meningismus.





Dermatologic: Warm with good turgor. No rash. Atraumatic.  Diffuse erythematous 

rash with excoriations.  Some yellowish crusting on the lesions that are 

actively draining.





Chest: Atraumatic. No chest wall tenderness to palpation.





Musculoskeletal: Moves all extremities well. No range of motion deficits. no 

muscular or joint tenderness. No paraspinal muscle tenderness. no midline spinal

 tenderness or step-off.





Genitourinary: Examination deferred





Neurologic: No gross neurologic deficits.





Psychiatric: Normal mood. 











Course





- Re-evaluation


Re-evalutation: 





09/28/20 14:24


The patient clearly has a severe case of contact dermatitis however he has not 

in extremes.  I think he would probably benefit from dermatology to see him if 

possible.  We will start with some IM Vistaril and dexamethasone here.  We will 

send him home on dexamethasone, Vistaril, and Pepcid.  We will also give him 

some Bactrim DS to cover for the secondary skin infection.  Will refer to Dr. Claudia pierre as an outpatient for further evaluation and management.





- Vital Signs


Vital signs: 





                                        











Temp Pulse Resp BP Pulse Ox


 


 97.9 F   99   18   151/85 H  98 


 


 09/28/20 10:43  09/28/20 09:36  09/28/20 09:36  09/28/20 09:36  09/28/20 09:36














- Laboratory


Result Diagrams: 


                                 09/28/20 11:14





                                 09/28/20 11:14


Laboratory results interpreted by me: 





                                        











  09/28/20 09/28/20





  11:14 11:14


 


WBC  18.4 H 


 


RDW  15.0 H 


 


Eosinophils % (Manual)  9 H 


 


Abs Neuts (Manual)  13.2 H 


 


Absolute Eos (Manual)  1.7 H 


 


Sodium   136.9 L


 


Total Bilirubin   1.6 H


 


Total Protein   6.1 L














Discharge





- Discharge


Clinical Impression: 


 Elevated blood pressure reading





Contact dermatitis


Qualifiers:


 Contact dermatitis type: irritant Contact dermatitis trigger: non-food plants 

Qualified Code(s): L24.7 - Irritant contact dermatitis due to plants, except 

food





Condition: Good


Disposition: HOME, SELF-CARE


Instructions:  Contact Dermatitis ([facility])


Prescriptions: 


Hydroxyzine Pamoate [Vistaril 50 mg Capsule] 50 mg PO Q6HP PRN 7 Days #28 capsu

le


 PRN Reason: Itching


Sulfamethoxazole/Trimethoprim [Bactrim Ds Tablet] 1 each PO BID 10 Days #20 

tablet


Dexamethasone [Decadron 4 mg Tablet] 4 mg PO DAILY 7 Days #7 tablet


Famotidine [Pepcid 20 mg Tablet] 20 mg PO BID 7 Days #14 tablet


Forms:  Elevated Blood Pressure, Return to Work


Referrals: 


LEV FRANCOIS MD [COMMUNITY BASED STAFF] - 09/30/20